# Patient Record
(demographics unavailable — no encounter records)

---

## 2024-10-22 NOTE — DATA REVIEWED
[de-identified] : US of the right foot performed at [facility] on 8/26/24:  4.5 x 4 x .8 cm heterogenous lesion with associated internal blood flow possibly representing an arteriovenous malformation  IMPRESSION:  Heterogenous lesion with associated internal blood flow possibly representing an arteriovenous malformation. Lesion is more prominent than in august 2016.

## 2024-10-22 NOTE — HISTORY OF PRESENT ILLNESS
[FreeTextEntry1] : Luis is a nonverbal 19-year-old male with a history for autism spectrum disorder who is brought in today by his family for an initial evaluation of a mass on his right foot. Patient's mother reports that this is something she noticed in 2016. She brought her concerns up to the pediatrician, who recommended obtaining an US of the right foot. US showed a mass, possibly an AVM. Only observation was recommended. She is here today for orthopedic evaluation. Mother feels like the mass has increased. She is unsure if he is in any pain given his nonverbal nature, however she believes he sometimes expresses discomfort while doing activities. A recent ultrasound was obtained on 8/26/24. They are here today to go over the results and seek further orthopedic evaluation. Denies associated fevers, chills or night sweats.

## 2024-10-22 NOTE — REVIEW OF SYSTEMS
[No Acute Changes] : No acute changes since previous visit [Change in Activity] : no change in activity

## 2024-10-22 NOTE — ASSESSMENT
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   Clinical findings and previously obtained US results were reviewed at length with the patient and parent. US of the right foot obtained on 8/26/24 showed there is a likely arteriovenous malformation present which appears more prominent compared to prior imaging performed in August 2016. The mother also feels like the lesion is growing and may be causing the patient discomfort. At this time, I would like to obtain an MRI of patient's right LE, including the distal tibia and ankle, to further evaluate the soft-tissue and to characterize the arteriovenous malformation to help us determine a diagnosis and treatment plan. The MRI will help us determine whether this will require potential intervention involving surgical resection. Our  will contact family with MRI authorization. Follow-up will occur once the patient and their family obtain MRI results.   Documented by Karen Cortes acting as a scribe for Dr. Bedolla on 10/22/2024.   The above documentation completed by the scribe is an accurate record of both my words and actions.

## 2024-10-22 NOTE — REASON FOR VISIT
[Consultation] : a consultation visit [Mother] : mother [Family Member] : family member [FreeTextEntry1] : Right foot mass

## 2024-10-22 NOTE — PHYSICAL EXAM
[FreeTextEntry1] : Right Foot focused exam:  Palpable mass over the lateral aspect of the foot.  There is full active and passive ROM of the foot  Muscle strength is 5/5, NV intact. Skin is warm to touch.  Pulses palpated.  Capillary refill +1 in all five digits.

## 2024-11-04 NOTE — PHYSICAL EXAM
[Well Developed] : well developed [Well Nourished] : well nourished [NAD] : in no acute distress [Thin] : is not thin [Pallor] : no pallor [Short For Stated Age] : not short for stated age [Adipose Appearing] : adipose appearing [PERRL] : pupils were equal, round, reactive to light  [icteric] : anicteric [Moist & Pink Mucous Membranes] : moist and pink mucous membranes [CTAB] : lungs clear to auscultation bilaterally [Respiratory Distress] : no respiratory distress  [Wheeze] : no wheezing  [Regular Rate and Rhythm] : regular rate and rhythm [Normal S1, S2] : normal S1 and S2 [Murmur] : no murmur [Soft] : soft  [Distended] : non distended [Tender] : non tender [Normal Bowel Sounds] : normal bowel sounds [Guarding] : no guarding [Stool Palpable] : no stool palpable [Mass ___ cm] : no masses were palpated [No HSM] : no hepatosplenomegaly appreciated [No Back Lesion] : no back lesion [Lymphadenopathy] : no lymphadenopathy  [Joint Swelling] : no joint swelling [Normal Tone] : normal tone [Focal Deficits] : no focal deficits [Verbal] : non verbal [Well-Perfused] : well-perfused [Edema] : no edema [Cyanosis] : no cyanosis [Rash] : no rash [Jaundice] : no jaundice [FreeTextEntry1] : Noncommunicative, but cooperative adolescent [de-identified] : Abdominal striae [de-identified] : Noncommunicative but cooperative

## 2024-11-04 NOTE — HISTORY OF PRESENT ILLNESS
[Crohn's Disease] : Crohn's Disease  [Small Bowel] : small bowel [Colon] : colon [Perianal Disease] : The patient does not have perianal disease. [de-identified] : 2019 [de-identified] : 12/12/2019 15 yo in the autistic spectrum, diagnosed with Crohn's ileocolitis. W/U at [place] ([facility]) included EGD and colonoscopy without small bowel imaging. Labs included a significantly elevated calprotectin. The decision was made to start Pentasa 4 caps bid which the child takes regularly. His frequency of stools decreased and consistency improved. There is no rectal bleeding, fever or other obvious systemic symptom. The child is happy, does not appear to experience pain, and he has maintained his weight. F/U calprotectin after initiation of Rx was associated with a drop to ~300, and a subsequent repeat was ~400. As a consequence, a switch to an anti-TNF has been suggested. Parents recognize that there is no way to get the child through repeated infusions, and come for an opinion regarding future management. Apart from autism the child is described as healthy. There is no IBD in the family  Interval Hx 9/30/2021 Family returns to [facility] for care after having insurance issues complicating care at previous medical center. Since seeing me in consultation 2 years ago, patient has been taking methotrexate 25 mg po once weekly without other IBD medications or folate supplementation. Mother reports that child's symptoms were well controlled and that he continued gaining weight and growing normally. In the past few months, however, mother reports that the child became increasingly unwilling to take his medication. She had been hiding the tablets in food, but ultimately that was no longer accepted. The child takes other meds that are crushed and mixed with honey or purees, but she was told that the methotrexate could not be taken that way, and in the past month the pt has not received any methotrexate. With the discontinuation of treatment, the mother now notes more frequent and looser BM, and intermittent fecal soiling that has not previously been an issue. She now presents to discuss what might be done to manage Luis's IBD. She continues to state that he will not cooperate with iv infusions or frequent subQ injections.  5/1/2023 Now 17 yrs old. Pt remains on methotrexate 25 mg po once weekly along with daily folate supplementation. Mother reports solid stools qd without visible blood. He continues eating well ("all the time") and has continued gaining weight excessively despite seeing a Nutritionist to help with weight management. Pt has had documented transaminases on a number of occasions over the past year, leading to a referral to [facility] due to concern about possible MTX-induced toxicity. The subsequent evaluation did not identify any clearcut etiology of the LFTs, but the AST and ALT has now gradually fallen despite continued MTX use and a presumptive diagnosis of NAFLD has been made. A requested hepatic US has not yet been done. At present the mother's primary GI complaint is that he has frequent fecal soiling when he comes home from school. She is told that the child is taken to the  in school a few times a day, but this has made no difference. Mother and GM both note that he is rarely soiled on the weekends when he is home rather than in school. A fecal calprotectin was mildly elevated last summer, but the decision was made to see it would trend down, but to date a f/u sample has not been collected.  11/4/2024 Pt returns to  for f/u more than a year since last contact with the office. Pt with small and large bowel Crohn's, maintained chronically on methotrexate 25 mg po once weekly, folic acid and Vit D3 qd. His last evaluation in 7/2023 revealed mild-moderate active GI inflammation along with a calprotectin 240. After discussion with the mother regarding possible alternative IBD therapies she opted to not change anything, as she did not believe that he could cooperate with either iv, subQ or dietary treatments. Pt continues of an ad ezra diet, gaining weight excessively. A planned f/u US and hepatology office visit for monitoring of elevated transaminases was never arranged but is now scheduled. Routine labs just obtained include AST 49, ALT 84, , GGT 87, ESR 36, CRP 4. WBC and H/H wnl, but platelet count 145. Mother reports that she will be returning to [place] to live in the next few months and that Luis will be leaving with her. The father will remain in the [place]

## 2024-11-04 NOTE — ASSESSMENT
[Mild] : Mild [Educated Patient & Family about Diagnosis] : educated the patient and family about the diagnosis [FreeTextEntry1] : Autistic young man with mild Crohn's of the small and large bowel Obesity Elevated transaminases likely associated with NAFLD REC: 1. Continue current MTX 25 mg po qweek, folate and vit D supplementation qd 2. F/U with Dr Robertson re LFTs and NAFLD 3. Consider Hbg A1c at next blood draw as obesity, NAFLD and positive FH diabetes suggest possibility of metabolic syndrome; patient might be considered a candidate for anti-GLP-1 therapy 4. Mother aware that all of these issues as well as Luis's Crohn's disease will need to be evaluated and managed by physicians in [place]; mother starting the process of enrolling him for participation in in the [facility] medical [facility]

## 2024-11-19 NOTE — CONSULT LETTER
[Dear  ___] : Dear  [unfilled], [Consult Letter:] : I had the pleasure of evaluating your patient, [unfilled]. [Please see my note below.] : Please see my note below. [Consult Closing:] : Thank you very much for allowing me to participate in the care of this patient.  If you have any questions, please do not hesitate to contact me. [Sincerely,] : Sincerely, [FreeTextEntry3] : Sheri Robertson-Grupo, \par  [facility]\par

## 2024-11-19 NOTE — HISTORY OF PRESENT ILLNESS
[FreeTextEntry1] : Luis is a 19 year old autistic, obese male with IBD - Crohns (maintained on Methotrexate  25 mg weekly) and MASLD who presents today with MOC for follow up. He was last seen January 2023.   Current complaints:  He has lost 5 pounds since July.   Last labs 10/14/24: AST/ALT 49/84 (45/72)  AlkPhos 255  GGT 87 (34)(70) (58)  5/30/23: Ultrasound at outside center: an avascular, hypoechoic structure (~ 2 x 2 x 2 cm) that is likely consistent with FNH.  ** Given that he is unable to lay for an MRI without sedation, will hold off on MRI for now and will follow with ultrasounds.   8/30/23: Ultrasound: The liver is normal in size, measuring 16 cm. The echotexture is slightly heterogeneous no focal masses are identified. The hepatic surface demonstrates a smooth contour. There is a hypoechoic lesion noted in the right lobe of the liver measuring 2.6 x 2.3 x 2.1 cm. There is peripheral vascularity noted. ** Based on appearance, this can be focal fatty sparing vs adenoma vs FNH. No concern for malignant lesion at this time. Given his autism, he would require sedation for MRI and therefore will hold off on MRI for now and will follow lesion with ultrasound.   11/11/24: Ultrasound: Right hepatic lobe lesion previously visualized, not visualized on this imaging, mild splenomegaly. Homogenous liver parenchyma.   A Fibroscan was done today using the M probe:  dB/m TE 5.0 kPa  His current medications: Methotrexate 25 mg Q weekly (started ~2019 without dose changes) Folic Acid 1 mg daily Trazadone 50mg 1.5 tab No recent Anti-TNF use   eight 268lbs (273 7/2024)  Per mother, diet has not changed past couple of months.  " He eats a lot" and grabs a lot of food.  Trying to get him to exercise remains challenging.   No complaints today. He denies abdominal pain, nausea, vomiting, diarrhea, blood in stool, easy bleeding or bruising, rash, pruritus, jaundice, fevers, recurrent illnesses. Parents report IBD remission for years while on Methotrexate.  Luis is pending MRI of R L/E mass. Schedule pending.   Mother reports that she will be returning to [facility] to live in summer 2025 and that Luis will be leaving with her.  [Abdominal pain] : no abdominal pain [Nausea] : no nausea [Emesis] : no emesis [Diarrhea] : no diarrhea [Constipation] : no constipation [Blood in stool] : no blood in stool [Easy bruising/bleeding] : no easy bruising/bleeding [Rash] : no rash [Jaundice] : no jaundice [Fever] : no fever [Recurrent illness] : no recurrent illness [Scleral icterus] : no scleral icterus [Pruritus] : no pruritus

## 2024-11-19 NOTE — ASSESSMENT
[Educated Patient & Family about Diagnosis] : educated the patient and family about the diagnosis [FreeTextEntry1] : 17-year-old autistic, obese male with Crohns disease (maintained on Methotrexate  25 mg weekly), now noted to have elevated liver enzymes since August 2022 with normal bilirubin and alk phos.US in 6/2023 demonstrated avascular, hypoechoic structure, most recent US in 11/12024 with no steatosis or hepatic lesions. Previous fibro Scan revealed steatosis. His transaminases increased from last visit, now with alk phos to 200s.   The differential diagnosis of elevated liver enzymes includes NAFLD (given his BMI, ethnicity, gender and recent weight gain, this is highly likely and his Fibroscan did show steatosis) vs DILI from Methotrexate (MTX is known to cause both hepatic steatosis and fibrosis, however his Fibroscan shows no signs of fibrosis so this is less likely to be the cause and also the acute onset of significantly elevated liver enzymes is not typical of MTX induced DILI) vs infectious (given the acute rise in liver enzymes in August following normal labs in February of last year, this is possibly a contributing factor) vs intrinsic liver disease (ceruloplasmin, chronic viral serologies negative but will test for AIH and A1AT today) vs extrahepatic disease (will test for thyroid, celiac and muscle breakdown).  Reviewed extensively the dietary and exercise goals to treat NAFLD given that this is the most likely etiology. Reviewed ways to lower carb and sugar intake and encourage exercise.  Discussed the possibility of needing to stop MTX if this is contributing to his liver disease. Explained that given the acute rise in liver enzymes and the short duration of their rise, this is less likely to be related to MTX. Also given that MTX has kept him in remission for years, would not consider stopping drug at this time. Lastly, the normal bilirubin and alk phos are reassuring that there is not significant liver injury at this time.   Plan: 1. Plan to coordinate with MRI with sedation 2.  3.

## 2024-11-19 NOTE — PHYSICAL EXAM
[NAD] : in no acute distress [Moist & Pink Mucous Membranes] : moist and pink mucous membranes [CTAB] : lungs clear to auscultation bilaterally [Soft] : soft [Normal Bowel Sounds] : normal bowel sounds [Hepatomegaly ___cm BCM] : hepatomegaly [unfilled] cm BCM [Normal Tone] : normal tone [Well-Perfused] : well-perfused [Interactive] : interactive [Adipose Appearing] : adipose appearing [icteric] : anicteric [Respiratory Distress] : no respiratory distress  [Murmur] : no murmur [Distended] : non distended [Tender] : non tender [Splenomegaly ___cm BCM] : no splenomegaly [Focal Deficits] : no focal deficits [Edema] : no edema [Cyanosis] : no cyanosis [Rash] : no rash [Jaundice] : no jaundice